# Patient Record
Sex: FEMALE | Race: WHITE
[De-identification: names, ages, dates, MRNs, and addresses within clinical notes are randomized per-mention and may not be internally consistent; named-entity substitution may affect disease eponyms.]

---

## 2023-03-27 ENCOUNTER — HOSPITAL ENCOUNTER (EMERGENCY)
Dept: HOSPITAL 75 - ER | Age: 23
Discharge: HOME | End: 2023-03-27
Payer: SELF-PAY

## 2023-03-27 VITALS — SYSTOLIC BLOOD PRESSURE: 112 MMHG | DIASTOLIC BLOOD PRESSURE: 83 MMHG

## 2023-03-27 VITALS — HEIGHT: 62.99 IN | WEIGHT: 189.6 LBS | BODY MASS INDEX: 33.59 KG/M2

## 2023-03-27 DIAGNOSIS — Z28.310: ICD-10-CM

## 2023-03-27 DIAGNOSIS — Z20.822: ICD-10-CM

## 2023-03-27 DIAGNOSIS — J02.0: Primary | ICD-10-CM

## 2023-03-27 PROCEDURE — 87636 SARSCOV2 & INF A&B AMP PRB: CPT

## 2023-03-27 PROCEDURE — 99284 EMERGENCY DEPT VISIT MOD MDM: CPT

## 2023-03-27 PROCEDURE — 87430 STREP A AG IA: CPT

## 2023-03-27 NOTE — ED COUGH/URI
General


Chief Complaint:  Oral/Throat Problems


Stated Complaint:  SORE THROAT


Source:  patient





History of Present Illness


Date Seen by Provider:  Mar 27, 2023


Time Seen by Provider:  04:15


Initial Comments


PT ARRIVES VIA POV WITH ANOTHER FEMALE


PT C/O SORE THROAT THAT BEGAN EARLIER TODAY / SUNDAY 03/26/23


ALSO STATES SHE HAS HAD A PRODUCTIVE COUGH WITH CLEAR SPUTUM FOR THE LAST 4 DAYS


NO KNOWN FEVER


NO DIFFICULTY BREATHING


HURTS TO SWALLOW, BUT IS ABLE TO HANDLE LIQUIDS AND SECRETIONS





NO HEADACHE


NO BODY ACHES


NO GI SYMPTOMS





SHE HAS NOT TAKEN ANYTHING FOR SYMPTOMS 





PT IS NOT COVID OR FLU VACCINATED





SHE HAS HISTORY OF THROAT INFECTIONS, AND STATES HE HAS HAD "3 THROAT SURGERIES 

TO DRAIN PUS POCKETS FROM MY TONSILS"--2009, 2010 AND 2013.S HE HAS HAD A 

TONSILLECTOMY





LMP 02/25/23. NORMAL. NO BIRTH CONTROL





NO CHRONIC MEDICAL PROBLEMS AND DOES NOT TAKE ANY MEDICATIONS





PCP: NONE





Allergies and Home Medications


Allergies


Coded Allergies:  


     No Known Drug Allergies (Unverified , 3/27/23)





Patient Home Medication List


Home Medication List Reviewed:  Yes


Amoxicillin/Potassium Clav (Amox Tr-K Clv 875-125 mg Tab) 875 Mg-125 Mg Tablet, 

1 EACH PO BID


   Prescribed by: DAMON BAH on 3/27/23 0507


Prednisone (Prednisone) 20 Mg Tab, 40 MG PO DAILY


   Prescribed by: DAMON BAH on 3/27/23 0507





Review of Systems


Review of Systems


Constitutional:  no symptoms reported


EENTM:  see HPI, throat pain


Respiratory:  see HPI, cough


Cardiovascular:  no symptoms reported


Gastrointestinal:  no symptoms reported


Genitourinary:  no symptoms reported


Pregnant:  No


LMP:  Feb 25, 2023


Musculoskeletal:  no symptoms reported


Skin:  no symptoms reported


Psychiatric/Neurological:  No Symptoms Reported


Hematologic/Lymphatic:  No Symptoms Reported


Immunological/Allergic:  no symptoms reported





Past Medical-Social-Family Hx


Patient Social History


Tobacco Use?:  No


Use of E-Cig and/or Vaping dev:  No


Substance use?:  No


Alcohol Use?:  No





Past Medical History


Surgeries:  Yes (TONSILLAR SURGERY X 3--2009,2010, 2013)


Tonsillectomy


Respiratory:  No


Cardiac:  No


Neurological:  No


Pregnant:  No


Genitourinary:  No


Gastrointestinal:  No


Musculoskeletal:  No


Endocrine:  No


HEENT:  Yes


Tonsilitis


Cancer:  No


Psychosocial:  No


Integumentary:  No


Blood Disorders:  No





Physical Exam





Vital Signs - First Documented








 3/27/23





 04:33


 


Temp 36.8


 


Pulse 109


 


Resp 18


 


B/P (MAP) 127/67 (87)


 


Pulse Ox 98


 


O2 Delivery Room Air





Capillary Refill :


Height: '"


Weight: lbs. oz. kg;  BMI


Method:


General Appearance:  WD/WN, no apparent distress, obese, other (SITTING Palestinian-

STYLE, SMILING, DOES NOT APPEAR ILL OR TO BE IN ANY DISCOMFORT OR DISTRESS. )


HEENT:  PERRL/EOMI, TMs normal, pharyngeal erythema; No tonsillar exudate; other

(CLEAR POST NASAL DRAINAGE. UVULA MILDLY SWOLLEN AND VERY ERYTHEMATOUS, IN 

ADDITION TO MARKED ERYTHEMA TO TONSILLAR BEDS.)


Neck:  non-tender, full range of motion, supple, normal inspection


Respiratory:  normal breath sounds, no respiratory distress, no accessory muscle

use


Cardiovascular:  regular rate, rhythm, no murmur


Gastrointestinal:  non tender, soft


Extremities:  normal inspection, normal capillary refill


Neurologic/Psychiatric:  CNs II-XII nml as tested, no motor/sensory deficits, 

alert, normal mood/affect, oriented x 3


Skin:  normal color, warm/dry; No rash; tattoos/piercings (EXTENSIVE TATTOOS AND

PIERCINGS INCLUDING BOTH TATTOOS AND PIERCINGS TO FACE. )





Progress/Results/Core Measures


Suspected Sepsis


SIRS


Temperature: 


Pulse:  


Respiratory Rate: 


 


Blood Pressure  / 


Mean:





Results/Orders


Lab Results





Laboratory Tests








Test


 3/27/23


04:16 Range/Units


 


 


Influenza Type A (RT-PCR) Not Detected  Not Detecte  


 


Influenza Type B (RT-PCR) Not Detected  Not Detecte  


 


SARS-CoV-2 RNA (RT-PCR) Not Detected  Not Detecte  


 


Group A Streptococcus Screen POSITIVE H NEGATIVE  








My Orders





Orders - DAMON BAH DO


Rapid Strep A Screen (3/27/23 04:17)


Covid 19 Inhouse Test (3/27/23 04:17)


Influenza A And B By Pcr (3/27/23 04:17)


Isolation Central Supply Req (3/27/23 04:17)


Penicillin G Proc/Ricardo 1.2 Mu (Bicillin (3/27/23 05:15)


Ketorolac Injection (Toradol Injection) (3/27/23 05:15)





Vital Signs/I&O











 3/27/23





 04:33


 


Temp 36.8


 


Pulse 109


 


Resp 18


 


B/P (MAP) 127/67 (87)


 


Pulse Ox 98


 


O2 Delivery Room Air





Capillary Refill :


Progress Note :  


Progress Note





PLACED IN ISOLATION ROOM


PPE WORN


COVID, FLU AND STREP TESTING DONE. 





PT IS AFEBRILE


NO COUGH


NO DYSPNEA


NO HYPOXIA


DURING ER STAY





NO PRIOR VISITS HERE. 





GIVEN:


-PENICILLIN IM


-TORADOL





DISCUSSED TEST RESULTS, SYMPTOMATIC TREATMENT, ANTICIPATED COURSE, MEDICATIONS, 

NEED FOR FOLLOW UP AND RETURN PRECAUTIONS. 





PT STATES SHE WILL FOLLOW UP WITH [facility], AS SHE TAKES HER SON THERE.





Departure


Impression





   Primary Impression:  


   Strep pharyngitis


Disposition:  01 HOME, SELF-CARE


Condition:  Stable





Departure-Patient Inst.


Decision time for Depature:  05:05


Referrals:  


SIS ACKERMAN,LOCAL PHYSICIAN (PCP)


Primary Care Physician








JANICE


Patient Instructions:  Strep Throat ED





Add. Discharge Instructions:  


LOTS OF CLEAR LIQUIDS





FREQUENT SALT WATER GARGLES





TYLENOL AND MOTRIN AS NEEDED FOR PAIN OR FEVER





FOLLOW UP WITH [facility] IN 3-4 DAYS IF NO BETTER, RETURN TO ER IF SYMPTOMS WORSEN





All discharge instructions reviewed with patient and/or family. Voiced 

understanding.


Scripts


Prednisone (Prednisone) 20 Mg Tab


40 MG PO DAILY, #6 TAB 0 Refills


   Prov: DAMON BAH DO         3/27/23 


Amoxicillin/Potassium Clav (Amox Tr-K Clv 875-125 mg Tab) 875 Mg-125 Mg Tablet


1 EACH PO BID for 15 Days, #30 TAB


   Prov: DAMON BAH DO         3/27/23











DAMON BAH DO                 Mar 27, 2023 04:27